# Patient Record
Sex: FEMALE | Race: WHITE | NOT HISPANIC OR LATINO | Employment: OTHER | URBAN - METROPOLITAN AREA
[De-identification: names, ages, dates, MRNs, and addresses within clinical notes are randomized per-mention and may not be internally consistent; named-entity substitution may affect disease eponyms.]

---

## 2024-07-12 ENCOUNTER — APPOINTMENT (OUTPATIENT)
Dept: PRIMARY CARE | Facility: CLINIC | Age: 29
End: 2024-07-12
Payer: COMMERCIAL

## 2024-07-19 ENCOUNTER — APPOINTMENT (OUTPATIENT)
Dept: PRIMARY CARE | Facility: CLINIC | Age: 29
End: 2024-07-19
Payer: COMMERCIAL

## 2024-07-26 ENCOUNTER — LAB (OUTPATIENT)
Dept: LAB | Facility: LAB | Age: 29
End: 2024-07-26
Payer: COMMERCIAL

## 2024-07-26 ENCOUNTER — OFFICE VISIT (OUTPATIENT)
Dept: PRIMARY CARE | Facility: CLINIC | Age: 29
End: 2024-07-26
Payer: COMMERCIAL

## 2024-07-26 VITALS
WEIGHT: 172 LBS | HEART RATE: 74 BPM | DIASTOLIC BLOOD PRESSURE: 60 MMHG | BODY MASS INDEX: 31.65 KG/M2 | OXYGEN SATURATION: 98 % | SYSTOLIC BLOOD PRESSURE: 100 MMHG | HEIGHT: 62 IN | RESPIRATION RATE: 18 BRPM

## 2024-07-26 DIAGNOSIS — R55 NEAR SYNCOPE: ICD-10-CM

## 2024-07-26 DIAGNOSIS — R00.2 PALPITATIONS: ICD-10-CM

## 2024-07-26 DIAGNOSIS — R42 DIZZINESS: ICD-10-CM

## 2024-07-26 DIAGNOSIS — Z78.0 POSTMENOPAUSAL: ICD-10-CM

## 2024-07-26 DIAGNOSIS — R55 NEAR SYNCOPE: Primary | ICD-10-CM

## 2024-07-26 DIAGNOSIS — D50.8 IRON DEFICIENCY ANEMIA SECONDARY TO INADEQUATE DIETARY IRON INTAKE: ICD-10-CM

## 2024-07-26 PROBLEM — Z90.710 H/O TOTAL VAGINAL HYSTERECTOMY: Status: ACTIVE | Noted: 2023-03-13

## 2024-07-26 PROBLEM — E55.9 VITAMIN D DEFICIENCY: Status: ACTIVE | Noted: 2021-05-26

## 2024-07-26 PROBLEM — E03.9 ACQUIRED HYPOTHYROIDISM: Status: ACTIVE | Noted: 2021-05-26

## 2024-07-26 PROCEDURE — 36415 COLL VENOUS BLD VENIPUNCTURE: CPT

## 2024-07-26 PROCEDURE — 3008F BODY MASS INDEX DOCD: CPT | Performed by: NURSE PRACTITIONER

## 2024-07-26 PROCEDURE — 84443 ASSAY THYROID STIM HORMONE: CPT

## 2024-07-26 PROCEDURE — 85027 COMPLETE CBC AUTOMATED: CPT

## 2024-07-26 PROCEDURE — 82670 ASSAY OF TOTAL ESTRADIOL: CPT

## 2024-07-26 PROCEDURE — 83036 HEMOGLOBIN GLYCOSYLATED A1C: CPT

## 2024-07-26 PROCEDURE — 99203 OFFICE O/P NEW LOW 30 MIN: CPT | Performed by: NURSE PRACTITIONER

## 2024-07-26 PROCEDURE — 84439 ASSAY OF FREE THYROXINE: CPT

## 2024-07-26 PROCEDURE — 84481 FREE ASSAY (FT-3): CPT

## 2024-07-26 PROCEDURE — 84144 ASSAY OF PROGESTERONE: CPT

## 2024-07-26 PROCEDURE — 82306 VITAMIN D 25 HYDROXY: CPT

## 2024-07-26 PROCEDURE — 82607 VITAMIN B-12: CPT

## 2024-07-26 PROCEDURE — 80053 COMPREHEN METABOLIC PANEL: CPT

## 2024-07-26 PROCEDURE — 80061 LIPID PANEL: CPT

## 2024-07-26 RX ORDER — FERROUS SULFATE 325(65) MG
325 TABLET, DELAYED RELEASE (ENTERIC COATED) ORAL EVERY OTHER DAY
Qty: 15 TABLET | Refills: 2 | Status: SHIPPED | OUTPATIENT
Start: 2024-07-26 | End: 2024-10-24

## 2024-07-26 RX ORDER — LEVOTHYROXINE SODIUM 75 UG/1
75 TABLET ORAL DAILY
COMMUNITY

## 2024-07-26 RX ORDER — TRIAMCINOLONE ACETONIDE 1 MG/G
OINTMENT TOPICAL 2 TIMES DAILY
COMMUNITY
Start: 2022-08-31

## 2024-07-26 ASSESSMENT — ENCOUNTER SYMPTOMS
NEUROLOGICAL NEGATIVE: 1
ALLERGIC/IMMUNOLOGIC NEGATIVE: 1
RESPIRATORY NEGATIVE: 1
CONSTITUTIONAL NEGATIVE: 1
EYES NEGATIVE: 1
HEMATOLOGIC/LYMPHATIC NEGATIVE: 1
PSYCHIATRIC NEGATIVE: 1
ROS SKIN COMMENTS: PALE
MUSCULOSKELETAL NEGATIVE: 1
CARDIOVASCULAR NEGATIVE: 1
ENDOCRINE NEGATIVE: 1
GASTROINTESTINAL NEGATIVE: 1

## 2024-07-26 ASSESSMENT — PAIN SCALES - GENERAL: PAINLEVEL: 0-NO PAIN

## 2024-07-26 NOTE — PROGRESS NOTES
"Subjective   Patient ID: Radha Evans is a 29 y.o. female who presents for Establish Care.    Patient is feeling really dizzy and like her pressure is really low. She went to [facility] a few years ago, she had hysterectomy.   She reports she get very short of breath, will get random chest pains when she feels dizzy but not all of the time.   She doesn't have bp cuff at home.   Patient never passes out but she does feel like she could a lot of the time.   She does not drink a lot of water but drinks a lot of sparkling water.       Review of Systems   Constitutional: Negative.    HENT: Negative.     Eyes: Negative.    Respiratory: Negative.     Cardiovascular: Negative.    Gastrointestinal: Negative.    Endocrine: Negative.    Genitourinary: Negative.    Musculoskeletal: Negative.    Skin: Negative.         Pale     Allergic/Immunologic: Negative.    Neurological: Negative.    Hematological: Negative.    Psychiatric/Behavioral: Negative.         Objective   /60   Pulse 74   Resp 18   Ht 1.575 m (5' 2\")   Wt 78 kg (172 lb)   SpO2 98%   BMI 31.46 kg/m²     Physical Exam  Vitals reviewed.   HENT:      Head: Normocephalic.   Neck:      Thyroid: No thyroid mass, thyromegaly or thyroid tenderness.   Cardiovascular:      Rate and Rhythm: Normal rate and regular rhythm.      Pulses: Normal pulses.      Heart sounds: Normal heart sounds. No murmur heard.     No friction rub. No gallop.   Pulmonary:      Effort: Pulmonary effort is normal. No respiratory distress.      Breath sounds: Normal breath sounds. No stridor. No wheezing, rhonchi or rales.   Chest:      Chest wall: No tenderness.   Musculoskeletal:      Cervical back: Full passive range of motion without pain and normal range of motion.   Lymphadenopathy:      Cervical: No cervical adenopathy.   Neurological:      General: No focal deficit present.      Mental Status: She is alert and oriented to person, place, and time. Mental status is at " baseline.         Assessment/Plan   Problem List Items Addressed This Visit             ICD-10-CM    Iron deficiency anemia secondary to inadequate dietary iron intake D50.8    Relevant Medications    ferrous sulfate 325 (65 Fe) MG EC tablet     Other Visit Diagnoses         Codes    Near syncope    -  Primary R55    Relevant Orders    Lipid Panel    Comprehensive Metabolic Panel    CBC    Hemoglobin A1C    Vitamin D 25-Hydroxy,Total (for eval of Vitamin D levels)    Vitamin B12    Holter or Event Cardiac Monitor    Transthoracic Echo Complete    T4, free    T3, free    TSH    Dizziness     R42    Relevant Orders    Lipid Panel    Comprehensive Metabolic Panel    CBC    Hemoglobin A1C    Vitamin D 25-Hydroxy,Total (for eval of Vitamin D levels)    Vitamin B12    Holter or Event Cardiac Monitor    Transthoracic Echo Complete    T4, free    T3, free    TSH    Palpitations     R00.2    Relevant Orders    Lipid Panel    Comprehensive Metabolic Panel    CBC    Hemoglobin A1C    Vitamin D 25-Hydroxy,Total (for eval of Vitamin D levels)    Vitamin B12    Holter or Event Cardiac Monitor    Transthoracic Echo Complete    T4, free    T3, free    TSH    Postmenopausal     Z78.0    Relevant Orders    Estradiol    Progesterone

## 2024-07-27 LAB
25(OH)D3 SERPL-MCNC: 15 NG/ML (ref 30–100)
ALBUMIN SERPL BCP-MCNC: 4.7 G/DL (ref 3.4–5)
ALP SERPL-CCNC: 58 U/L (ref 33–110)
ALT SERPL W P-5'-P-CCNC: 13 U/L (ref 7–45)
ANION GAP SERPL CALC-SCNC: 12 MMOL/L (ref 10–20)
AST SERPL W P-5'-P-CCNC: 12 U/L (ref 9–39)
BILIRUB SERPL-MCNC: 0.6 MG/DL (ref 0–1.2)
BUN SERPL-MCNC: 9 MG/DL (ref 6–23)
CALCIUM SERPL-MCNC: 9.7 MG/DL (ref 8.6–10.6)
CHLORIDE SERPL-SCNC: 105 MMOL/L (ref 98–107)
CHOLEST SERPL-MCNC: 190 MG/DL (ref 0–199)
CHOLESTEROL/HDL RATIO: 3.3
CO2 SERPL-SCNC: 27 MMOL/L (ref 21–32)
CREAT SERPL-MCNC: 0.62 MG/DL (ref 0.5–1.05)
EGFRCR SERPLBLD CKD-EPI 2021: >90 ML/MIN/1.73M*2
ERYTHROCYTE [DISTWIDTH] IN BLOOD BY AUTOMATED COUNT: 13 % (ref 11.5–14.5)
EST. AVERAGE GLUCOSE BLD GHB EST-MCNC: 100 MG/DL
ESTRADIOL SERPL-MCNC: 64 PG/ML
GLUCOSE SERPL-MCNC: 91 MG/DL (ref 74–99)
HBA1C MFR BLD: 5.1 %
HCT VFR BLD AUTO: 41.2 % (ref 36–46)
HDLC SERPL-MCNC: 57.2 MG/DL
HGB BLD-MCNC: 12.8 G/DL (ref 12–16)
LDLC SERPL CALC-MCNC: 123 MG/DL
MCH RBC QN AUTO: 25.3 PG (ref 26–34)
MCHC RBC AUTO-ENTMCNC: 31.1 G/DL (ref 32–36)
MCV RBC AUTO: 81 FL (ref 80–100)
NON HDL CHOLESTEROL: 133 MG/DL (ref 0–149)
NRBC BLD-RTO: 0 /100 WBCS (ref 0–0)
PLATELET # BLD AUTO: 216 X10*3/UL (ref 150–450)
POTASSIUM SERPL-SCNC: 4.1 MMOL/L (ref 3.5–5.3)
PROGEST SERPL-MCNC: <0.3 NG/ML
PROT SERPL-MCNC: 7.2 G/DL (ref 6.4–8.2)
RBC # BLD AUTO: 5.06 X10*6/UL (ref 4–5.2)
SODIUM SERPL-SCNC: 140 MMOL/L (ref 136–145)
T3FREE SERPL-MCNC: 3.1 PG/ML (ref 2.3–4.2)
T4 FREE SERPL-MCNC: 1.42 NG/DL (ref 0.78–1.48)
TRIGL SERPL-MCNC: 48 MG/DL (ref 0–149)
TSH SERPL-ACNC: 1.42 MIU/L (ref 0.44–3.98)
VIT B12 SERPL-MCNC: 892 PG/ML (ref 211–911)
VLDL: 10 MG/DL (ref 0–40)
WBC # BLD AUTO: 5.9 X10*3/UL (ref 4.4–11.3)

## 2024-07-29 ENCOUNTER — TELEPHONE (OUTPATIENT)
Dept: PRIMARY CARE | Facility: CLINIC | Age: 29
End: 2024-07-29

## 2024-07-30 ENCOUNTER — TELEPHONE (OUTPATIENT)
Dept: PRIMARY CARE | Facility: CLINIC | Age: 29
End: 2024-07-30
Payer: COMMERCIAL

## 2024-07-30 DIAGNOSIS — E55.9 VITAMIN D DEFICIENCY: Primary | ICD-10-CM

## 2024-07-30 RX ORDER — ERGOCALCIFEROL 1.25 MG/1
50000 CAPSULE ORAL
Qty: 12 CAPSULE | Refills: 0 | Status: SHIPPED | OUTPATIENT
Start: 2024-08-04 | End: 2024-10-27

## 2024-07-30 NOTE — TELEPHONE ENCOUNTER
Patient received a message from Juana regarding recent test results.  She would like a phone call to discuss the next steps of the results for the hormones.     What does she need to do about it?

## 2024-08-05 ENCOUNTER — TELEPHONE (OUTPATIENT)
Dept: PRIMARY CARE | Facility: CLINIC | Age: 29
End: 2024-08-05
Payer: COMMERCIAL

## 2024-08-05 NOTE — TELEPHONE ENCOUNTER
Patient called stating she would like to go over her labs with Juana and she would like to discuss going on  3rd Generation birth control pills.     Patient can be reached at [phone]  Please advise     Thank you !

## 2024-08-09 ENCOUNTER — HOSPITAL ENCOUNTER (OUTPATIENT)
Dept: CARDIOLOGY | Facility: CLINIC | Age: 29
Discharge: HOME | End: 2024-08-09
Payer: COMMERCIAL

## 2024-08-09 ENCOUNTER — OFFICE VISIT (OUTPATIENT)
Dept: OBSTETRICS AND GYNECOLOGY | Facility: CLINIC | Age: 29
End: 2024-08-09
Payer: COMMERCIAL

## 2024-08-09 ENCOUNTER — APPOINTMENT (OUTPATIENT)
Dept: CARDIOLOGY | Facility: CLINIC | Age: 29
End: 2024-08-09
Payer: COMMERCIAL

## 2024-08-09 VITALS
SYSTOLIC BLOOD PRESSURE: 94 MMHG | WEIGHT: 178.6 LBS | DIASTOLIC BLOOD PRESSURE: 69 MMHG | HEIGHT: 62 IN | BODY MASS INDEX: 32.87 KG/M2

## 2024-08-09 DIAGNOSIS — R00.2 PALPITATIONS: ICD-10-CM

## 2024-08-09 DIAGNOSIS — E34.9 HORMONE IMBALANCE: Primary | ICD-10-CM

## 2024-08-09 DIAGNOSIS — R55 NEAR SYNCOPE: ICD-10-CM

## 2024-08-09 DIAGNOSIS — R42 DIZZINESS: ICD-10-CM

## 2024-08-09 LAB
AORTIC VALVE PEAK VELOCITY: 1.25 M/S
AV PEAK GRADIENT: 6.2 MMHG
AVA (PEAK VEL): 2.02 CM2
EJECTION FRACTION APICAL 4 CHAMBER: 69.8
EJECTION FRACTION: 65 %
LEFT ATRIUM VOLUME AREA LENGTH INDEX BSA: 18.2 ML/M2
LEFT VENTRICLE INTERNAL DIMENSION DIASTOLE: 4.29 CM (ref 3.5–6)
LEFT VENTRICULAR OUTFLOW TRACT DIAMETER: 2 CM
MITRAL VALVE E/A RATIO: 1.24
RIGHT VENTRICLE FREE WALL PEAK S': 11 CM/S
RIGHT VENTRICLE PEAK SYSTOLIC PRESSURE: 16.4 MMHG

## 2024-08-09 PROCEDURE — 93306 TTE W/DOPPLER COMPLETE: CPT | Performed by: INTERNAL MEDICINE

## 2024-08-09 PROCEDURE — 1036F TOBACCO NON-USER: CPT

## 2024-08-09 PROCEDURE — 99203 OFFICE O/P NEW LOW 30 MIN: CPT

## 2024-08-09 PROCEDURE — 3008F BODY MASS INDEX DOCD: CPT

## 2024-08-09 PROCEDURE — 93246 EXT ECG>7D<15D RECORDING: CPT

## 2024-08-09 PROCEDURE — 93306 TTE W/DOPPLER COMPLETE: CPT

## 2024-08-09 ASSESSMENT — PATIENT HEALTH QUESTIONNAIRE - PHQ9
2. FEELING DOWN, DEPRESSED OR HOPELESS: NOT AT ALL
1. LITTLE INTEREST OR PLEASURE IN DOING THINGS: NOT AT ALL
SUM OF ALL RESPONSES TO PHQ9 QUESTIONS 1 & 2: 0

## 2024-08-09 NOTE — PROGRESS NOTES
Subjective   Patient ID: Radha Evans is a 29 y.o. female who presents for Premenopausal symptoms.  HPI  Patient here for new patient visit with concerns of hormonal issues,  possible premenopausal symptoms with mood changes and weight gain. Patient reports that mood swings are all throughout the month and not specific to certain times of month.     Patient reports being seen by primary care and having hormone levels drawn (estrogen and progesterone) showing low hormones.    Patient does endorse having a partial hysterectomy in 2023 for adenomyosis.       Review of Systems    Objective   Physical Exam  Constitutional:       Appearance: Normal appearance.   Eyes:      Conjunctiva/sclera: Conjunctivae normal.   Pulmonary:      Effort: Pulmonary effort is normal.   Neurological:      Mental Status: She is alert.   Psychiatric:         Mood and Affect: Mood normal.         Assessment/Plan   Reviewed hormone levels with patient and significant other. Estradiol 64 and progesterone <0.3. discussed with patient that levels are consistent with the follicular phase of the menstrual cycle and do not indicate menopausal state. Discussed with patient and  removal of uterus does not stop female hormones as her ovaries release hormones. Consulted with Dr. Alvarenga given patient's symptoms. Per Dr. Alvarenga labs are wnl, though can order an FSH to completely rule out menopause as it was not completed initially by PCP. FSH order placed.     Mood wings: discussed option for referral to psych or med management which patient declines at this time.     Weight gain: review of EMR shows 20# weight gain in 2 years.  TSH completed 2 weeks ago wnl. Discussed with patient increasing physical activity to 150 minutes a week with 2 days of strength training.  Encouraged plant forward eating less processed foods.           RUPINDER Banegas 08/09/24 2:06 PM

## 2024-08-13 ENCOUNTER — HOSPITAL ENCOUNTER (OUTPATIENT)
Dept: RADIOLOGY | Facility: HOSPITAL | Age: 29
Discharge: HOME | End: 2024-08-13
Payer: COMMERCIAL

## 2024-08-13 ENCOUNTER — APPOINTMENT (OUTPATIENT)
Dept: OBSTETRICS AND GYNECOLOGY | Facility: CLINIC | Age: 29
End: 2024-08-13
Payer: COMMERCIAL

## 2024-08-13 ENCOUNTER — TELEPHONE (OUTPATIENT)
Dept: PRIMARY CARE | Facility: CLINIC | Age: 29
End: 2024-08-13

## 2024-08-13 VITALS
SYSTOLIC BLOOD PRESSURE: 100 MMHG | HEIGHT: 63 IN | BODY MASS INDEX: 30.83 KG/M2 | WEIGHT: 174 LBS | DIASTOLIC BLOOD PRESSURE: 71 MMHG

## 2024-08-13 DIAGNOSIS — N95.1 MENOPAUSAL SYMPTOM: Primary | ICD-10-CM

## 2024-08-13 DIAGNOSIS — E55.9 VITAMIN D DEFICIENCY: Primary | ICD-10-CM

## 2024-08-13 DIAGNOSIS — R10.2 PELVIC PAIN: ICD-10-CM

## 2024-08-13 DIAGNOSIS — R68.82 DECREASED SEX DRIVE: ICD-10-CM

## 2024-08-13 DIAGNOSIS — R63.5 WEIGHT GAIN: ICD-10-CM

## 2024-08-13 PROCEDURE — 1036F TOBACCO NON-USER: CPT | Performed by: OBSTETRICS & GYNECOLOGY

## 2024-08-13 PROCEDURE — 3008F BODY MASS INDEX DOCD: CPT | Performed by: OBSTETRICS & GYNECOLOGY

## 2024-08-13 PROCEDURE — 76856 US EXAM PELVIC COMPLETE: CPT | Performed by: RADIOLOGY

## 2024-08-13 PROCEDURE — 99204 OFFICE O/P NEW MOD 45 MIN: CPT | Performed by: OBSTETRICS & GYNECOLOGY

## 2024-08-13 PROCEDURE — 76830 TRANSVAGINAL US NON-OB: CPT | Performed by: RADIOLOGY

## 2024-08-13 PROCEDURE — 76856 US EXAM PELVIC COMPLETE: CPT

## 2024-08-13 NOTE — PROGRESS NOTES
"Subjective   Patient ID: Radha Evans is a 29 y.o. female who presents for Weight Gain (Over short period of time /Hyst done 2yrs ago has both ovaries, Per pt there is a h/o ovarian /Hotflashes, sex drive and mood swings).    Patient presents as a new patient in regards to her menopause symptoms    Status post hysterectomy  Blood work reviewed from July which shows a normal estradiol level  FSH ordered.    Discussed with patient her symptoms are during the day and not at night  Is having trouble sleeping due to insomnia and her mental process but not due to night sweats  Has noticed weight gain  Thyroid testing is normal  Will follow-up with endocrinology or primary care provider for medication      ROS  All Normal Review of Systems  Constitutional: no fever, no chills, no recent weight gain, no recent weight loss and no fatigue.    Gastrointestinal: no abdominal pain, no constipation, no nausea, no diarrhea and no vomiting.    Genitourinary: no dysuria, no urinary incontinence, no vaginal dryness, no vaginal itching, no dyspareunia, no pelvic pain, no dysmenorrhea, no sexual problems, no change in urinary frequency, no vaginal discharge, no unexplained vaginal bleeding and no lesion/sore.    Breasts: No masses.  No nipple discharge.  No redness    Objective   /71   Ht 1.6 m (5' 3\")   Wt 78.9 kg (174 lb)   BMI 30.82 kg/m²    Physical Exam  General: No distress.  Alert and oriented  Abdomen: Soft, nontender, nondistended  External Genitalia:  no masses.  no erythema.  no discharge noted.  Vagina:  no masses.  Cuff intact    Cervix: absent    Uterus:  absent  Adnexa: R: no masses, non tender.    Adnexa: L: no masses.  non tender  Extremities: No swelling  Psych: No sadness.      Assessment/Plan   1) menopause symptoms  Estradiol is normal  Thyroid testing is normal  No need for any supplements at this point  Symptoms are mostly during the day.  Is not having night sweats at all    2) Weight gain  Please " see your primary care provider for possible medical treatment    3) Pelvic pain on exam  Ultrasound ordered    Thank you for your visit to our office today.

## 2024-08-19 ENCOUNTER — APPOINTMENT (OUTPATIENT)
Dept: CARDIOLOGY | Facility: HOSPITAL | Age: 29
End: 2024-08-19
Payer: COMMERCIAL

## 2024-08-19 ENCOUNTER — APPOINTMENT (OUTPATIENT)
Dept: CARDIOLOGY | Facility: CLINIC | Age: 29
End: 2024-08-19
Payer: COMMERCIAL

## 2024-08-23 DIAGNOSIS — E03.9 ACQUIRED HYPOTHYROIDISM: Primary | ICD-10-CM

## 2024-08-23 RX ORDER — LEVOTHYROXINE SODIUM 75 UG/1
75 TABLET ORAL DAILY
Qty: 90 TABLET | Refills: 1 | Status: SHIPPED | OUTPATIENT
Start: 2024-08-23 | End: 2025-02-19

## 2024-08-30 ENCOUNTER — APPOINTMENT (OUTPATIENT)
Dept: PRIMARY CARE | Facility: CLINIC | Age: 29
End: 2024-08-30
Payer: COMMERCIAL

## 2024-08-30 VITALS
SYSTOLIC BLOOD PRESSURE: 107 MMHG | WEIGHT: 174 LBS | DIASTOLIC BLOOD PRESSURE: 75 MMHG | BODY MASS INDEX: 30.83 KG/M2 | HEIGHT: 63 IN | RESPIRATION RATE: 18 BRPM | HEART RATE: 83 BPM

## 2024-08-30 DIAGNOSIS — D50.8 IRON DEFICIENCY ANEMIA SECONDARY TO INADEQUATE DIETARY IRON INTAKE: ICD-10-CM

## 2024-08-30 DIAGNOSIS — Z90.710 H/O: HYSTERECTOMY: ICD-10-CM

## 2024-08-30 DIAGNOSIS — I95.9 HYPOTENSION, UNSPECIFIED HYPOTENSION TYPE: Primary | ICD-10-CM

## 2024-08-30 PROCEDURE — 99213 OFFICE O/P EST LOW 20 MIN: CPT | Performed by: NURSE PRACTITIONER

## 2024-08-30 PROCEDURE — 3008F BODY MASS INDEX DOCD: CPT | Performed by: NURSE PRACTITIONER

## 2024-08-30 ASSESSMENT — ENCOUNTER SYMPTOMS
FATIGUE: 1
MYALGIAS: 1
ARTHRALGIAS: 1

## 2024-08-30 NOTE — PATIENT INSTRUCTIONS
Foods containing high levels of these specific phytoestrogens include:    Fruits, including apples, berries, grapes, peaches, pears, plums  Grains, such as barley, oats, wheat germ  Liquids derived from plants, specifically beer, coffee, olive oil, red wine, tea  Nuts and Seeds, including almonds, flaxseeds, peanuts, sesame seeds, sunflower seeds  Soy and soy products, such as soybeans, tofu, miso soup, miso paste   Vegetables, particularly broccoli, [place] sprouts, kale, onions, spinach, sprouts   Research

## 2024-08-30 NOTE — ASSESSMENT & PLAN NOTE
At current progesterone was low on random check estrogen was normal. We discussed that ovaries are compensating to continue making estrogen. At some point when she approaches menopause she should make sure estrogen stores remain to prevent decline in bone density.   Make sure to get 1200 mg of calcium a day and keep vitamin D levels between 30-50.   Weight bearing exercises when possible.

## 2024-08-30 NOTE — ASSESSMENT & PLAN NOTE
TTE normal.  No significant ectopic burden on holter monitor. Orthostatic vital signs without any significant changes.   Send to electrophysiology for autonomic testing given duration of symptoms and activity intolerance at current.

## 2024-08-30 NOTE — PROGRESS NOTES
"Subjective   Patient ID: Radha Evans is a 29 y.o. female who presents for Follow-up.    Patient had adenomyosis was on birth control for a year to stop the periods because they lasted for 2 weeks. Had to switch 3-4 times but then they opted to do hysterectomy .   Hot flashes come and go during the day and joint stiffness and pain.     Hypotension- dizzy, heart problems gets shaky, almost syncope as well.       Review of Systems   Constitutional:  Positive for fatigue.   Endocrine: Heat intolerance: hot flashes.   Musculoskeletal:  Positive for arthralgias and myalgias.     Objective   /75 (Patient Position: Standing)   Pulse 83   Resp 18   Ht 1.6 m (5' 3\")   Wt 78.9 kg (174 lb)   BMI 30.82 kg/m²     Physical Exam  Vitals reviewed.   HENT:      Head: Normocephalic.   Cardiovascular:      Rate and Rhythm: Normal rate and regular rhythm.      Pulses: Normal pulses.      Heart sounds: Normal heart sounds.   Pulmonary:      Effort: Pulmonary effort is normal.      Breath sounds: Normal breath sounds.   Neurological:      General: No focal deficit present.      Mental Status: She is alert and oriented to person, place, and time. Mental status is at baseline.       Assessment/Plan   Problem List Items Addressed This Visit             ICD-10-CM    H/O: hysterectomy Z90.710     At current progesterone was low on random check estrogen was normal. We discussed that ovaries are compensating to continue making estrogen. At some point when she approaches menopause she should make sure estrogen stores remain to prevent decline in bone density.   Make sure to get 1200 mg of calcium a day and keep vitamin D levels between 30-50.   Weight bearing exercises when possible.          Iron deficiency anemia secondary to inadequate dietary iron intake D50.8     Increase iron through dietary fiber intake currently supplemented on prescription iron.          Hypotension - Primary I95.9     TTE normal.  No significant ectopic " burden on holter monitor. Orthostatic vital signs without any significant changes.   Send to electrophysiology for autonomic testing given duration of symptoms and activity intolerance at current.          Relevant Orders    Referral to Cardiac Electrophysiology

## 2024-09-05 ENCOUNTER — APPOINTMENT (OUTPATIENT)
Dept: CARDIOLOGY | Facility: CLINIC | Age: 29
End: 2024-09-05
Payer: COMMERCIAL

## 2024-09-19 PROBLEM — D62 ANEMIA DUE TO BLOOD LOSS, ACUTE: Status: ACTIVE | Noted: 2018-05-23

## 2024-09-19 PROBLEM — E05.90 HYPERTHYROIDISM: Status: ACTIVE | Noted: 2022-08-04

## 2024-09-19 PROBLEM — J10.1 INFLUENZA B: Status: ACTIVE | Noted: 2024-09-19

## 2024-09-19 PROBLEM — N60.11 FIBROCYSTIC BREAST CHANGES, BILATERAL: Status: ACTIVE | Noted: 2017-01-04

## 2024-09-19 PROBLEM — N60.12 FIBROCYSTIC BREAST CHANGES, BILATERAL: Status: ACTIVE | Noted: 2017-01-04

## 2024-09-30 ENCOUNTER — APPOINTMENT (OUTPATIENT)
Dept: CARDIOLOGY | Facility: CLINIC | Age: 29
End: 2024-09-30
Payer: COMMERCIAL

## 2025-03-04 ENCOUNTER — APPOINTMENT (OUTPATIENT)
Dept: PRIMARY CARE | Facility: CLINIC | Age: 30
End: 2025-03-04
Payer: COMMERCIAL

## 2025-03-04 DIAGNOSIS — E03.9 ACQUIRED HYPOTHYROIDISM: ICD-10-CM

## 2025-03-04 RX ORDER — LEVOTHYROXINE SODIUM 75 UG/1
TABLET ORAL
Qty: 90 TABLET | Refills: 0 | Status: SHIPPED | OUTPATIENT
Start: 2025-03-04

## 2025-03-06 ENCOUNTER — TELEPHONE (OUTPATIENT)
Dept: PRIMARY CARE | Facility: CLINIC | Age: 30
End: 2025-03-06
Payer: COMMERCIAL

## 2025-03-07 ENCOUNTER — APPOINTMENT (OUTPATIENT)
Dept: PRIMARY CARE | Facility: CLINIC | Age: 30
End: 2025-03-07
Payer: COMMERCIAL

## 2025-06-04 DIAGNOSIS — E03.9 ACQUIRED HYPOTHYROIDISM: ICD-10-CM

## 2025-06-05 PROBLEM — E05.90 HYPERTHYROIDISM: Status: RESOLVED | Noted: 2022-08-04 | Resolved: 2025-06-05

## 2025-06-05 PROBLEM — J10.1 INFLUENZA B: Status: RESOLVED | Noted: 2024-09-19 | Resolved: 2025-06-05

## 2025-06-05 PROBLEM — D62 ANEMIA DUE TO BLOOD LOSS, ACUTE: Status: RESOLVED | Noted: 2018-05-23 | Resolved: 2025-06-05

## 2025-06-05 RX ORDER — LEVOTHYROXINE SODIUM 75 UG/1
TABLET ORAL
Qty: 90 TABLET | Refills: 0 | Status: SHIPPED | OUTPATIENT
Start: 2025-06-05

## 2025-06-06 ENCOUNTER — APPOINTMENT (OUTPATIENT)
Dept: PRIMARY CARE | Facility: CLINIC | Age: 30
End: 2025-06-06
Payer: COMMERCIAL

## 2025-06-06 NOTE — PROGRESS NOTES
"Subjective   Patient ID: Radha Evans is a 29 y.o. female who presents for No chief complaint on file..    HPI entered by Medical Assistant and reviewed/updated by myself.    Patient presents in the office today to establish care. Patient was seeing ***. Patient does not go to *** anymore due to ***. Patient heard about the practice through ***.     Patient presents in office for ***. Ongoing x ***. Symptoms included ****. Has tried ***. Admits/Denies relief.       The patient's allergies, medications, and history were reviewed with them today and updated as indicated.    Review of Systems   Objective   Vital Signs: There were no vitals taken for this visit.    Physical ExamPOCT today: No results found for this visit on 06/06/25.     Assessment & Plan  There are no diagnoses linked to this encounter.Reviewed/discussed treatment options and health maintenance. Take medications as prescribed. We will contact you with the results of any ordered testing; please send a Fondu message or call the office if you do not hear from us. Follow up in the office {Follow up:14792::\"and as needed.\"} Return sooner if symptoms do not resolve as expected.     Kacy Aguirre, APRN-CNP, DNP  Family Nurse Practitioner  [facility]  "

## 2025-06-09 ENCOUNTER — APPOINTMENT (OUTPATIENT)
Dept: PRIMARY CARE | Facility: CLINIC | Age: 30
End: 2025-06-09
Payer: COMMERCIAL

## 2025-06-09 VITALS
BODY MASS INDEX: 24.63 KG/M2 | DIASTOLIC BLOOD PRESSURE: 82 MMHG | HEIGHT: 63 IN | HEART RATE: 94 BPM | SYSTOLIC BLOOD PRESSURE: 112 MMHG | WEIGHT: 139 LBS

## 2025-06-09 DIAGNOSIS — R42 DIZZINESS: Primary | ICD-10-CM

## 2025-06-09 DIAGNOSIS — R00.2 PALPITATIONS: ICD-10-CM

## 2025-06-09 PROCEDURE — 1036F TOBACCO NON-USER: CPT | Performed by: FAMILY MEDICINE

## 2025-06-09 PROCEDURE — 99214 OFFICE O/P EST MOD 30 MIN: CPT | Performed by: FAMILY MEDICINE

## 2025-06-09 PROCEDURE — 3008F BODY MASS INDEX DOCD: CPT | Performed by: FAMILY MEDICINE

## 2025-06-09 ASSESSMENT — PATIENT HEALTH QUESTIONNAIRE - PHQ9
SUM OF ALL RESPONSES TO PHQ9 QUESTIONS 1 AND 2: 0
2. FEELING DOWN, DEPRESSED OR HOPELESS: NOT AT ALL
1. LITTLE INTEREST OR PLEASURE IN DOING THINGS: NOT AT ALL

## 2025-06-09 ASSESSMENT — ENCOUNTER SYMPTOMS
DIZZINESS: 0
SHORTNESS OF BREATH: 0
FEVER: 0
ACTIVITY CHANGE: 0
HEADACHES: 0
FATIGUE: 0

## 2025-06-09 NOTE — PROGRESS NOTES
"Subjective   Patient ID: Radha Evans is a 29 y.o. female who presents for New Patient Visit (Having dizziness, sob, eyes blurry. Did a holter a year ago).    Dizziness/lightheadedness  - reports she has had an extensive history of low blood pressure, low iron and low vitamin d   - 1 year ago she had a heart monitor placed on her \"holter\" which was negative   - was recommended to f/u with a cardiologist for tilt-table testing but did not follow up   - she is now having regular dizziness, lightheadedness, weakness, fatigue   - reports when she stands up for long periods she often will feel dizzy and need to sit down   - all her symptoms have been getting gradually worse with time   - does typically stay well hydrated and eats regular meals throughout the course of the day   - has been on semaglutide since January of this year and has lost 35 pounds   - does have known hypothyroidism and has been on the same dose of synthroid over many years   - did have her last labs checked in August of this year   - has noticed worsening hair loss, as well as difficulty with her weight          Review of Systems   Constitutional:  Negative for activity change, fatigue and fever.   Respiratory:  Negative for shortness of breath.    Cardiovascular:  Negative for chest pain.   Neurological:  Negative for dizziness and headaches.       Objective   /82 (BP Location: Left arm, Patient Position: Sitting)   Pulse 94   Ht 1.588 m (5' 2.5\")   Wt 63 kg (139 lb)   BMI 25.02 kg/m²     Physical Exam  Constitutional:       Appearance: Normal appearance.   Cardiovascular:      Rate and Rhythm: Normal rate and regular rhythm.   Pulmonary:      Effort: Pulmonary effort is normal.      Breath sounds: Normal breath sounds.   Neurological:      Mental Status: She is alert.   Psychiatric:         Mood and Affect: Mood normal.         Behavior: Behavior normal.         Assessment/Plan   Problem List Items Addressed This Visit  "   None  Visit Diagnoses         Codes      Dizziness    -  Primary R42    stable  - positive orthostatic hypertension in office today   - concern for POTS   - referral placed for autonomic tesitng     Relevant Orders    CBC and Auto Differential    Comprehensive metabolic panel    Vitamin D 25-Hydroxy,Total (for eval of Vitamin D levels)    Vitamin B12    Tsh With Reflex To Free T4 If Abnormal    Hemoglobin A1c    Referral to Cardiology      Palpitations     R00.2    stable  - concern for POTS   - check for autonomic testing with cardiology   - f/u with specialist     Relevant Orders    CBC and Auto Differential    Comprehensive metabolic panel    Vitamin D 25-Hydroxy,Total (for eval of Vitamin D levels)    Vitamin B12    Tsh With Reflex To Free T4 If Abnormal    Hemoglobin A1c    Referral to Cardiology

## 2025-06-10 LAB
25(OH)D3+25(OH)D2 SERPL-MCNC: 31 NG/ML (ref 30–100)
ALBUMIN SERPL-MCNC: 4.5 G/DL (ref 3.6–5.1)
ALP SERPL-CCNC: 52 U/L (ref 31–125)
ALT SERPL-CCNC: 9 U/L (ref 6–29)
ANION GAP SERPL CALCULATED.4IONS-SCNC: 11 MMOL/L (CALC) (ref 7–17)
AST SERPL-CCNC: 12 U/L (ref 10–30)
BASOPHILS # BLD AUTO: 28 CELLS/UL (ref 0–200)
BASOPHILS NFR BLD AUTO: 0.5 %
BILIRUB SERPL-MCNC: 0.5 MG/DL (ref 0.2–1.2)
BUN SERPL-MCNC: 6 MG/DL (ref 7–25)
CALCIUM SERPL-MCNC: 9.5 MG/DL (ref 8.6–10.2)
CHLORIDE SERPL-SCNC: 107 MMOL/L (ref 98–110)
CO2 SERPL-SCNC: 21 MMOL/L (ref 20–32)
CREAT SERPL-MCNC: 0.57 MG/DL (ref 0.5–0.96)
EGFRCR SERPLBLD CKD-EPI 2021: 127 ML/MIN/1.73M2
EOSINOPHIL # BLD AUTO: 281 CELLS/UL (ref 15–500)
EOSINOPHIL NFR BLD AUTO: 5.1 %
ERYTHROCYTE [DISTWIDTH] IN BLOOD BY AUTOMATED COUNT: 12.7 % (ref 11–15)
EST. AVERAGE GLUCOSE BLD GHB EST-MCNC: 100 MG/DL
EST. AVERAGE GLUCOSE BLD GHB EST-SCNC: 5.5 MMOL/L
GLUCOSE SERPL-MCNC: 99 MG/DL (ref 65–99)
HBA1C MFR BLD: 5.1 %
HCT VFR BLD AUTO: 38.7 % (ref 35–45)
HGB BLD-MCNC: 12.1 G/DL (ref 11.7–15.5)
LYMPHOCYTES # BLD AUTO: 1177 CELLS/UL (ref 850–3900)
LYMPHOCYTES NFR BLD AUTO: 21.4 %
MCH RBC QN AUTO: 26 PG (ref 27–33)
MCHC RBC AUTO-ENTMCNC: 31.3 G/DL (ref 32–36)
MCV RBC AUTO: 83 FL (ref 80–100)
MONOCYTES # BLD AUTO: 451 CELLS/UL (ref 200–950)
MONOCYTES NFR BLD AUTO: 8.2 %
NEUTROPHILS # BLD AUTO: 3564 CELLS/UL (ref 1500–7800)
NEUTROPHILS NFR BLD AUTO: 64.8 %
PLATELET # BLD AUTO: 229 THOUSAND/UL (ref 140–400)
PMV BLD REES-ECKER: 12.1 FL (ref 7.5–12.5)
POTASSIUM SERPL-SCNC: 4.4 MMOL/L (ref 3.5–5.3)
PROT SERPL-MCNC: 7.1 G/DL (ref 6.1–8.1)
RBC # BLD AUTO: 4.66 MILLION/UL (ref 3.8–5.1)
SODIUM SERPL-SCNC: 139 MMOL/L (ref 135–146)
TSH SERPL-ACNC: 0.68 MIU/L
VIT B12 SERPL-MCNC: 492 PG/ML (ref 200–1100)
WBC # BLD AUTO: 5.5 THOUSAND/UL (ref 3.8–10.8)

## 2025-06-27 ENCOUNTER — APPOINTMENT (OUTPATIENT)
Dept: CARDIOLOGY | Facility: CLINIC | Age: 30
End: 2025-06-27
Payer: COMMERCIAL

## 2025-07-02 ENCOUNTER — APPOINTMENT (OUTPATIENT)
Dept: CARDIOLOGY | Facility: CLINIC | Age: 30
End: 2025-07-02
Payer: COMMERCIAL

## 2025-07-02 ENCOUNTER — TELEPHONE (OUTPATIENT)
Dept: PRIMARY CARE | Facility: CLINIC | Age: 30
End: 2025-07-02

## 2025-07-02 VITALS
HEART RATE: 110 BPM | HEIGHT: 63 IN | DIASTOLIC BLOOD PRESSURE: 62 MMHG | WEIGHT: 133 LBS | SYSTOLIC BLOOD PRESSURE: 90 MMHG | BODY MASS INDEX: 23.57 KG/M2

## 2025-07-02 DIAGNOSIS — R42 DIZZINESS AND GIDDINESS: ICD-10-CM

## 2025-07-02 DIAGNOSIS — I95.9 HYPOTENSION, UNSPECIFIED HYPOTENSION TYPE: Primary | ICD-10-CM

## 2025-07-02 DIAGNOSIS — R00.0 TACHYCARDIA, UNSPECIFIED: ICD-10-CM

## 2025-07-02 DIAGNOSIS — R00.2 PALPITATIONS: ICD-10-CM

## 2025-07-02 PROCEDURE — 93000 ELECTROCARDIOGRAM COMPLETE: CPT | Performed by: INTERNAL MEDICINE

## 2025-07-02 NOTE — LETTER
July 3, 2025     Aron Tapia MD  [id number]  [facility] [address]    Patient: Radha Evans   YOB: 1995   Date of Visit: 7/2/2025       Dear Dr. Aron Tapia MD:    Thank you for referring Radha Evans to me for evaluation. Below are my notes for this consultation.  If you have questions, please do not hesitate to call me. I look forward to following your patient along with you.       Sincerely,     Radha Babcock, APRN-CNP      CC: No Recipients  ______________________________________________________________________________________    Chief Complaint:  Chief Complaint   Patient presents with   • New Patient Visit     Palpitations on and off 2.5 years       Radha Evans is a 29 y.o. female that presents to the office today with her  and children for new patient cardiac evaluation.  She was referred by her primary care provider Dr. Aron Tapia MD for palpitations.  She has no documented history of CAD or valvular abnormalities.  She has a past medical history of palpitations, tachycardia, dizziness, hypertension, hypothyroidism, vitamin D deficiency.  She denies tobacco use, vaping, alcohol use, recreational drug use.  Denies caffeine intake.      She reports she has had palpitations, lightheadedness, tachycardia and low blood pressure for some time but feels her symptoms are worsening.  She reports she was referred to electrophysiology last year but did not follow through.  She reports her heart rate can go as high as 150s at rest.  She reports that she becomes lightheaded dizzy with position changes.  Denies any chest pain, chest pressure, chest tightness.  Reports extreme fatigue and shortness of breath on exertion.      Testing Reviewed  EKG obtained in the office today and verified with Dr. Leavitt shows sinus tachycardia   bpm, QT/Qtc  320/433    8/9/2024 event monitor  The predominant rhythm was Sinus. * The Maximum Heart Rate recorded was 155 bpm, 08/ 15 15: 35:  24, the Minimum Heart Rate recorded was 46 bpm, 08/ 22 07: 25: 49, and the Average Heart Rate was 82 bpm. * There were 19 SVE beats with a burden of < 1 % . * There were 40 Patient Triggers.    8/9/2024 echocardiogram  1. The left ventricular systolic function is normal, with a visually estimated ejection fraction of 65%.   2. There is normal right ventricular global systolic function.   3. RVSP within normal limits.      CBC:   Lab Results   Component Value Date    WBC 5.5 06/09/2025    RBC 4.66 06/09/2025    HGB 12.1 06/09/2025    HCT 38.7 06/09/2025     06/09/2025        CMP:    Lab Results   Component Value Date     06/09/2025    K 4.4 06/09/2025     06/09/2025    CO2 21 06/09/2025    BUN 6 (L) 06/09/2025    CREATININE 0.57 06/09/2025    GLUCOSE 99 06/09/2025    CALCIUM 9.5 06/09/2025       Lipid Profile:    Lab Results   Component Value Date    TRIG 48 07/26/2024    HDL 57.2 07/26/2024    LDLCALC 123 (H) 07/26/2024       BMP:  Lab Results   Component Value Date     06/09/2025     07/26/2024    K 4.4 06/09/2025    K 4.1 07/26/2024     06/09/2025     07/26/2024    CO2 21 06/09/2025    CO2 27 07/26/2024    BUN 6 (L) 06/09/2025    BUN 9 07/26/2024    CREATININE 0.57 06/09/2025    CREATININE 0.62 07/26/2024       CBC:  Lab Results   Component Value Date    WBC 5.5 06/09/2025    WBC 5.9 07/26/2024    RBC 4.66 06/09/2025    RBC 5.06 07/26/2024    HGB 12.1 06/09/2025    HGB 12.8 07/26/2024    HCT 38.7 06/09/2025    HCT 41.2 07/26/2024    MCV 83.0 06/09/2025    MCV 81 07/26/2024    MCH 26.0 (L) 06/09/2025    MCH 25.3 (L) 07/26/2024    MCHC 31.3 (L) 06/09/2025    MCHC 31.1 (L) 07/26/2024    RDW 12.7 06/09/2025    RDW 13.0 07/26/2024     06/09/2025     07/26/2024    MPV 12.1 06/09/2025       Hepatic Function Panel:    Lab Results   Component Value Date    ALKPHOS 52 06/09/2025    ALT 9 06/09/2025    AST 12 06/09/2025    PROT 7.1 06/09/2025    BILITOT 0.5 06/09/2025  "      HgBA1c:    Lab Results   Component Value Date    HGBA1C 5.1 06/09/2025       TSH:    Lab Results   Component Value Date    TSH 0.68 06/09/2025       Problem List[1]    Social History[2]    Medical History[3]    Current Medications[4]    Gluten    Family History[5]    Surgical History[6]       Review of systems  Constitutional: No weight loss, fever, chills, weakness.  Positive for fatigue  HEENT: No visual loss, blurred vision, double vision or yellow sclerae  Skin: No rash or itching  Cardiovascular: No chest pain, pressure or discomfort, edema.  Positive for frequent palpitations  Respiratory: Positive for exertional shortness of breath.denies cough or sputum  Gastrointestinal: No nausea, vomiting or diarrhea. No bloody or dark tarry stools.  Neurological: Positive for lightheadedness.  No headache, syncope.   Musculoskeletal: No muscle, back pain, joint pain or stiffness.  Hematologic: No anemia, bleeding or bruising.    BP 90/62 (BP Location: Left arm, Patient Position: Sitting)   Pulse 110   Ht 1.588 m (5' 2.5\")   Wt 60.3 kg (133 lb)   BMI 23.94 kg/m²     Physical Exam  Constitutional: Well developed, awake/alert x 3, no distress.  Head/Neck: No JVD, No bruits  Respiratory/Thorax: patent airways, CTAB, normal breath sounds with good expansion.  Cardiovascular: Regular  rhythm, irregular rate, no murmurs, normal S1 and S2,   Gastrointestinal: Non distended, soft, non-tender, no rebound tenderness or guarding.  Extremities: No cyanosis, edema.   Neurological: Alert and oriented x 3. Moves extremities spontaneous with purpose.  Psychological: Appropriate mood and behavior  Skin: Warm and Dry. No lesions or rashes.     Assessment/Plan  Palpitations/tachycardia; obtain 14-day Zio patch  Shortness of breath; recent echocardiogram as noted above.  Hypertension;  Hydration has been emphasized  Increased sodium intake has been emphasized  Recommend knee-high compression stockings  Referral to " electrophysiology/Dr. Caballero  Follow-up in the office after monitor for results and further recommendations.  Please excuse any errors in grammar or translation related to dictation, voice recognition software was used to prepare this document.             [1]  Patient Active Problem List  Diagnosis   • H/O: hysterectomy   • Acquired hypothyroidism   • Iron deficiency anemia secondary to inadequate dietary iron intake   • Vitamin D deficiency   • Hypotension   • Fibrocystic breast changes, bilateral   [2]  Social History  Tobacco Use   • Smoking status: Never     Passive exposure: Never   • Smokeless tobacco: Never   • Tobacco comments:     Cigarettes casually   Vaping Use   • Vaping status: Never Used   Substance Use Topics   • Alcohol use: Never   • Drug use: Never   [3]  History reviewed. No pertinent past medical history.  [4]    Current Outpatient Medications:   •  levothyroxine (Synthroid, Levoxyl) 75 mcg tablet, TAKE 1 TABLET BY MOUTH ONCE DAILY EARLY IN THE MORNING, Disp: 90 tablet, Rfl: 0  •  semaglutide 2 mg/dose (8 mg/3 mL) pen injector, Inject 2 mg under the skin every 7 days., Disp: , Rfl:   •  triamcinolone (Kenalog) 0.1 % ointment, Apply topically 2 times a day., Disp: , Rfl:   [5]  Family History  Problem Relation Name Age of Onset   • Hyperlipidemia Father     • Diabetes Maternal Grandmother     • Diabetes Paternal Grandmother     • Heart disease Paternal Grandfather     [6]  Past Surgical History:  Procedure Laterality Date   • BREAST SURGERY Right     fatty cyst removal   •  SECTION, LOW TRANSVERSE     • HYSTERECTOMY

## 2025-07-02 NOTE — TELEPHONE ENCOUNTER
14 day zio patch [id number] no auth is required per Sharri at [facility] call reference# [id number]

## 2025-07-02 NOTE — PROGRESS NOTES
Chief Complaint:  Chief Complaint   Patient presents with    New Patient Visit     Palpitations on and off 2.5 years       Radha Evans is a 29 y.o. female that presents to the office today with her  and children for new patient cardiac evaluation.  She was referred by her primary care provider Dr. Aron Tapia MD for palpitations.  She has no documented history of CAD or valvular abnormalities.  She has a past medical history of palpitations, tachycardia, dizziness, hypertension, hypothyroidism, vitamin D deficiency.  She denies tobacco use, vaping, alcohol use, recreational drug use.  Denies caffeine intake.      She reports she has had palpitations, lightheadedness, tachycardia and low blood pressure for some time but feels her symptoms are worsening.  She reports she was referred to electrophysiology last year but did not follow through.  She reports her heart rate can go as high as 150s at rest.  She reports that she becomes lightheaded dizzy with position changes.  Denies any chest pain, chest pressure, chest tightness.  Reports extreme fatigue and shortness of breath on exertion.      Testing Reviewed  EKG obtained in the office today and verified with Dr. Leavitt shows sinus tachycardia   bpm, QT/Qtc  320/433    8/9/2024 event monitor  The predominant rhythm was Sinus. * The Maximum Heart Rate recorded was 155 bpm, 08/ 15 15: 35: 24, the Minimum Heart Rate recorded was 46 bpm, 08/ 22 07: 25: 49, and the Average Heart Rate was 82 bpm. * There were 19 SVE beats with a burden of < 1 % . * There were 40 Patient Triggers.    8/9/2024 echocardiogram  1. The left ventricular systolic function is normal, with a visually estimated ejection fraction of 65%.   2. There is normal right ventricular global systolic function.   3. RVSP within normal limits.      CBC:   Lab Results   Component Value Date    WBC 5.5 06/09/2025    RBC 4.66 06/09/2025    HGB 12.1 06/09/2025    HCT 38.7 06/09/2025      06/09/2025        CMP:    Lab Results   Component Value Date     06/09/2025    K 4.4 06/09/2025     06/09/2025    CO2 21 06/09/2025    BUN 6 (L) 06/09/2025    CREATININE 0.57 06/09/2025    GLUCOSE 99 06/09/2025    CALCIUM 9.5 06/09/2025       Lipid Profile:    Lab Results   Component Value Date    TRIG 48 07/26/2024    HDL 57.2 07/26/2024    LDLCALC 123 (H) 07/26/2024       BMP:  Lab Results   Component Value Date     06/09/2025     07/26/2024    K 4.4 06/09/2025    K 4.1 07/26/2024     06/09/2025     07/26/2024    CO2 21 06/09/2025    CO2 27 07/26/2024    BUN 6 (L) 06/09/2025    BUN 9 07/26/2024    CREATININE 0.57 06/09/2025    CREATININE 0.62 07/26/2024       CBC:  Lab Results   Component Value Date    WBC 5.5 06/09/2025    WBC 5.9 07/26/2024    RBC 4.66 06/09/2025    RBC 5.06 07/26/2024    HGB 12.1 06/09/2025    HGB 12.8 07/26/2024    HCT 38.7 06/09/2025    HCT 41.2 07/26/2024    MCV 83.0 06/09/2025    MCV 81 07/26/2024    MCH 26.0 (L) 06/09/2025    MCH 25.3 (L) 07/26/2024    MCHC 31.3 (L) 06/09/2025    MCHC 31.1 (L) 07/26/2024    RDW 12.7 06/09/2025    RDW 13.0 07/26/2024     06/09/2025     07/26/2024    MPV 12.1 06/09/2025       Hepatic Function Panel:    Lab Results   Component Value Date    ALKPHOS 52 06/09/2025    ALT 9 06/09/2025    AST 12 06/09/2025    PROT 7.1 06/09/2025    BILITOT 0.5 06/09/2025       HgBA1c:    Lab Results   Component Value Date    HGBA1C 5.1 06/09/2025       TSH:    Lab Results   Component Value Date    TSH 0.68 06/09/2025       Problem List[1]    Social History[2]    Medical History[3]    Current Medications[4]    Gluten    Family History[5]    Surgical History[6]       Review of systems  Constitutional: No weight loss, fever, chills, weakness.  Positive for fatigue  HEENT: No visual loss, blurred vision, double vision or yellow sclerae  Skin: No rash or itching  Cardiovascular: No chest pain, pressure or discomfort, edema.  Positive  "for frequent palpitations  Respiratory: Positive for exertional shortness of breath.denies cough or sputum  Gastrointestinal: No nausea, vomiting or diarrhea. No bloody or dark tarry stools.  Neurological: Positive for lightheadedness.  No headache, syncope.   Musculoskeletal: No muscle, back pain, joint pain or stiffness.  Hematologic: No anemia, bleeding or bruising.    BP 90/62 (BP Location: Left arm, Patient Position: Sitting)   Pulse 110   Ht 1.588 m (5' 2.5\")   Wt 60.3 kg (133 lb)   BMI 23.94 kg/m²     Physical Exam  Constitutional: Well developed, awake/alert x 3, no distress.  Head/Neck: No JVD, No bruits  Respiratory/Thorax: patent airways, CTAB, normal breath sounds with good expansion.  Cardiovascular: Regular  rhythm, irregular rate, no murmurs, normal S1 and S2,   Gastrointestinal: Non distended, soft, non-tender, no rebound tenderness or guarding.  Extremities: No cyanosis, edema.   Neurological: Alert and oriented x 3. Moves extremities spontaneous with purpose.  Psychological: Appropriate mood and behavior  Skin: Warm and Dry. No lesions or rashes.     Assessment/Plan  Palpitations/tachycardia; obtain 14-day Zio patch  Shortness of breath; recent echocardiogram as noted above.  Hypertension;  Hydration has been emphasized  Increased sodium intake has been emphasized  Recommend knee-high compression stockings  Referral to electrophysiology/Dr. Caballero  Follow-up in the office after monitor for results and further recommendations.  Please excuse any errors in grammar or translation related to dictation, voice recognition software was used to prepare this document.           [1]   Patient Active Problem List  Diagnosis    H/O: hysterectomy    Acquired hypothyroidism    Iron deficiency anemia secondary to inadequate dietary iron intake    Vitamin D deficiency    Hypotension    Fibrocystic breast changes, bilateral   [2]   Social History  Tobacco Use    Smoking status: Never     Passive exposure: Never "    Smokeless tobacco: Never    Tobacco comments:     Cigarettes casually   Vaping Use    Vaping status: Never Used   Substance Use Topics    Alcohol use: Never    Drug use: Never   [3] History reviewed. No pertinent past medical history.  [4]   Current Outpatient Medications:     levothyroxine (Synthroid, Levoxyl) 75 mcg tablet, TAKE 1 TABLET BY MOUTH ONCE DAILY EARLY IN THE MORNING, Disp: 90 tablet, Rfl: 0    semaglutide 2 mg/dose (8 mg/3 mL) pen injector, Inject 2 mg under the skin every 7 days., Disp: , Rfl:     triamcinolone (Kenalog) 0.1 % ointment, Apply topically 2 times a day., Disp: , Rfl:   [5]   Family History  Problem Relation Name Age of Onset    Hyperlipidemia Father      Diabetes Maternal Grandmother      Diabetes Paternal Grandmother      Heart disease Paternal Grandfather     [6]   Past Surgical History:  Procedure Laterality Date    BREAST SURGERY Right     fatty cyst removal     SECTION, LOW TRANSVERSE      HYSTERECTOMY

## 2025-07-02 NOTE — PATIENT INSTRUCTIONS
Please try to increase your water intake, you should try to drink 6 - 8 glasses/bottles of water per day to stay well hydrated.      Increase your salt intake    Try knee high compression stocking 20-30 compression stocking

## 2025-07-03 ENCOUNTER — ANCILLARY PROCEDURE (OUTPATIENT)
Dept: CARDIOLOGY | Facility: CLINIC | Age: 30
End: 2025-07-03
Payer: COMMERCIAL

## 2025-07-03 DIAGNOSIS — R00.0 TACHYCARDIA, UNSPECIFIED: ICD-10-CM

## 2025-07-03 DIAGNOSIS — R00.2 PALPITATIONS: ICD-10-CM

## 2025-07-07 ENCOUNTER — DOCUMENTATION (OUTPATIENT)
Dept: CARDIOLOGY | Facility: HOSPITAL | Age: 30
End: 2025-07-07
Payer: COMMERCIAL

## 2025-07-09 ENCOUNTER — OFFICE VISIT (OUTPATIENT)
Dept: CARDIOLOGY | Facility: CLINIC | Age: 30
End: 2025-07-09
Payer: COMMERCIAL

## 2025-07-09 VITALS
WEIGHT: 134 LBS | HEIGHT: 62 IN | DIASTOLIC BLOOD PRESSURE: 80 MMHG | BODY MASS INDEX: 24.66 KG/M2 | SYSTOLIC BLOOD PRESSURE: 120 MMHG

## 2025-07-09 DIAGNOSIS — G90.A POTS (POSTURAL ORTHOSTATIC TACHYCARDIA SYNDROME): Primary | ICD-10-CM

## 2025-07-09 DIAGNOSIS — R00.0 TACHYCARDIA, UNSPECIFIED: ICD-10-CM

## 2025-07-09 DIAGNOSIS — R00.2 PALPITATIONS: ICD-10-CM

## 2025-07-09 PROBLEM — N12 PYELONEPHRITIS: Status: ACTIVE | Noted: 2025-05-11

## 2025-07-09 PROBLEM — J06.9 VIRAL UPPER RESPIRATORY TRACT INFECTION: Status: ACTIVE | Noted: 2025-07-09

## 2025-07-09 PROCEDURE — 99202 OFFICE O/P NEW SF 15 MIN: CPT | Performed by: MARRIAGE & FAMILY THERAPIST

## 2025-07-09 PROCEDURE — 99203 OFFICE O/P NEW LOW 30 MIN: CPT | Performed by: INTERNAL MEDICINE

## 2025-07-09 PROCEDURE — 1036F TOBACCO NON-USER: CPT | Performed by: INTERNAL MEDICINE

## 2025-07-09 PROCEDURE — 3008F BODY MASS INDEX DOCD: CPT | Performed by: INTERNAL MEDICINE

## 2025-07-09 NOTE — ASSESSMENT & PLAN NOTE
This patient has been experiencing palpitations primarily when she is standing up.  She also has had some lightheadedness during these episodes.  No falls or syncope.  She is currently wearing an extended Holter monitor.  Autonomic testing is recommended to assess for POTS, and a neurology consultation can be obtained if necessary.  Further treatment recommendations will be made following review of the tilt table testing.

## 2025-07-09 NOTE — PROGRESS NOTES
"      Reason For Consult:    Palpitations    History Of Present Illness:    This is a 29-year-old female with palpitations.  She is being seen today for an electrophysiology consultation.  The patient reports the development of palpitations when she stands up.  She becomes very symptomatic especially when she takes a hot shower.  Sometimes she feels lightheaded and has felt near syncopal.  The symptoms began approximately a year and a half ago but have progressively worsened.  She is currently wearing an extended Holter monitor, and she is scheduled for a tilt table test at [facility] in August.    Past medical history:    Hypothyroidism    Family history: Positive for CAD    Social history: Non-smoker and denies significant caffeine use    Review of systems:  Other review of systems negative other than as outlined in HPI    Social History:  She reports that she has never smoked. She has never been exposed to tobacco smoke. She has never used smokeless tobacco. She reports that she does not drink alcohol and does not use drugs.    Family History:  Family History[1]    Allergies:  Gluten    Medications:  Current Outpatient Medications   Medication Instructions    levothyroxine (Synthroid, Levoxyl) 75 mcg tablet TAKE 1 TABLET BY MOUTH ONCE DAILY EARLY IN THE MORNING    semaglutide 2 mg, Every 7 days    triamcinolone (Kenalog) 0.1 % ointment 2 times daily       Vitals:      8/30/2024     4:10 PM 8/30/2024     4:11 PM 6/9/2025     3:35 PM 6/9/2025     4:00 PM 6/9/2025     4:02 PM 7/2/2025    10:06 AM 7/9/2025     3:43 PM   Vitals   Systolic 108 107 80 92 112 90 120   Diastolic 74 75 53 68 82 62 80   BP Location    Left arm Left arm Left arm Right arm   Heart Rate 83 83 81 79 94 110    Height   1.588 m (5' 2.5\")   1.588 m (5' 2.5\") 1.575 m (5' 2\")   Weight (lb)   139   133 134   BMI   25.02 kg/m2   23.94 kg/m2 24.51 kg/m2   BSA (m2)   1.67 m2   1.63 m2 1.63 m2   Visit Report Report Report Report Report Report " "Report Report       Physical Exam:    General Appearance:  Alert, oriented, no distress  Skin:  Warm and dry  Head and Neck:  No elevation of JVP, no carotid bruits  Cardiac Exam:  Rhythm is regular, S1 and S2 are normal, no murmur S3 or S4  Lungs:  Clear to auscultation  Extremities:  no edema  Neurologic:  No focal deficits  Psychiatric:  Appropriate mood and behavior    Lab Results:     CMP:  Recent Labs     06/09/25  1633 07/26/24  1540    140   K 4.4 4.1    105   CO2 21 27   ANIONGAP 11 12   BUN 6* 9   CREATININE 0.57 0.62   EGFR 127 >90     Recent Labs     06/09/25  1633 07/26/24  1540   ALBUMIN 4.5 4.7   ALKPHOS 52 58   ALT 9 13   AST 12 12   BILITOT 0.5 0.6     CBC:  Recent Labs     06/09/25  1633 07/26/24  1540   WBC 5.5 5.9   HGB 12.1 12.8   HCT 38.7 41.2    216   MCV 83.0 81     COAG: No results for input(s): \"PTT\", \"INR\", \"HAUF\", \"DDIMERVTE\", \"HAPTOGLOBIN\", \"FIBRINOGEN\" in the last 71122 hours.  HEME/ENDO:  Recent Labs     06/09/25  1633 07/26/24  1540   TSH 0.68 1.42   HGBA1C 5.1 5.1      CARDIAC: No results for input(s): \"LDH\", \"CKMB\", \"TROPHS\", \"BNP\" in the last 00354 hours.    No lab exists for component: \"CK\", \"CKMBP\"  Recent Labs     07/26/24  1540   CHOL 190   HDL 57.2   TRIG 48       Test Results (personally reviewed):    EKG: Normal sinus rhythm, no ventricular preexcitation  Echocardiogram: Normal left ventricular function  Holter monitor August 2024: Sinus rhythm, minimum heart rate 46 bpm, maximum heart rate 155 bpm, and average heart rate 82 bpm.    Assessment/Plan  Problem List Items Addressed This Visit           ICD-10-CM    POTS (postural orthostatic tachycardia syndrome) - Primary G90.A    This patient has been experiencing palpitations primarily when she is standing up.  She also has had some lightheadedness during these episodes.  No falls or syncope.  She is currently wearing an extended Holter monitor.  Autonomic testing is recommended to assess for POTS, and a " neurology consultation can be obtained if necessary.  Further treatment recommendations will be made following review of the tilt table testing.         Relevant Orders    Autonomic Testing     Other Visit Diagnoses         Codes      Palpitations     R00.2    stable  - concern for POTS   - check for autonomic testing with cardiology   - f/u with specialist       Tachycardia, unspecified     R00.0          James C Ramicone, DO         [1]   Family History  Problem Relation Name Age of Onset    Hyperlipidemia Father      Diabetes Maternal Grandmother      Diabetes Paternal Grandmother      Heart disease Paternal Grandfather

## 2025-07-22 ENCOUNTER — HOSPITAL ENCOUNTER (OUTPATIENT)
Dept: NEUROLOGY | Facility: HOSPITAL | Age: 30
Discharge: HOME | End: 2025-07-22
Payer: COMMERCIAL

## 2025-07-22 DIAGNOSIS — G90.A POTS (POSTURAL ORTHOSTATIC TACHYCARDIA SYNDROME): ICD-10-CM

## 2025-07-22 PROCEDURE — 95923 AUTONOMIC NRV SYST FUNJ TEST: CPT | Performed by: PSYCHIATRY & NEUROLOGY

## 2025-07-22 PROCEDURE — 95924 ANS PARASYMP & SYMP W/TILT: CPT | Performed by: PSYCHIATRY & NEUROLOGY

## 2025-07-28 ENCOUNTER — TELEPHONE (OUTPATIENT)
Dept: CARDIOLOGY | Facility: CLINIC | Age: 30
End: 2025-07-28

## 2025-07-28 PROCEDURE — 93248 EXT ECG>7D<15D REV&INTERPJ: CPT | Performed by: INTERNAL MEDICINE

## 2025-08-11 ENCOUNTER — APPOINTMENT (OUTPATIENT)
Dept: CARDIOLOGY | Facility: CLINIC | Age: 30
End: 2025-08-11
Payer: COMMERCIAL

## 2025-08-11 VITALS
WEIGHT: 128.4 LBS | BODY MASS INDEX: 23.63 KG/M2 | HEIGHT: 62 IN | HEART RATE: 106 BPM | SYSTOLIC BLOOD PRESSURE: 98 MMHG | DIASTOLIC BLOOD PRESSURE: 60 MMHG

## 2025-08-11 DIAGNOSIS — R00.2 PALPITATIONS: ICD-10-CM

## 2025-08-11 DIAGNOSIS — R94.31 ABNORMAL EKG: ICD-10-CM

## 2025-08-11 DIAGNOSIS — R00.0 TACHYCARDIA, UNSPECIFIED: ICD-10-CM

## 2025-08-11 DIAGNOSIS — I95.9 HYPOTENSION, UNSPECIFIED HYPOTENSION TYPE: ICD-10-CM

## 2025-08-11 DIAGNOSIS — R55 SYNCOPE, UNSPECIFIED SYNCOPE TYPE: ICD-10-CM

## 2025-08-11 DIAGNOSIS — G90.A POTS (POSTURAL ORTHOSTATIC TACHYCARDIA SYNDROME): ICD-10-CM

## 2025-08-11 DIAGNOSIS — R42 DIZZINESS AND GIDDINESS: ICD-10-CM

## 2025-08-11 DIAGNOSIS — I47.11 INAPPROPRIATE SINUS TACHYCARDIA: Primary | ICD-10-CM

## 2025-08-11 DIAGNOSIS — E03.9 ACQUIRED HYPOTHYROIDISM: ICD-10-CM

## 2025-08-11 DIAGNOSIS — E03.9 HYPOTHYROIDISM, UNSPECIFIED TYPE: ICD-10-CM

## 2025-08-11 PROCEDURE — 99214 OFFICE O/P EST MOD 30 MIN: CPT | Performed by: INTERNAL MEDICINE

## 2025-08-11 PROCEDURE — 3008F BODY MASS INDEX DOCD: CPT | Performed by: INTERNAL MEDICINE

## 2025-08-11 PROCEDURE — 93000 ELECTROCARDIOGRAM COMPLETE: CPT | Performed by: INTERNAL MEDICINE

## 2025-08-11 RX ORDER — METOPROLOL SUCCINATE 25 MG/1
25 TABLET, EXTENDED RELEASE ORAL DAILY
Qty: 90 TABLET | Refills: 3 | Status: SHIPPED | OUTPATIENT
Start: 2025-08-11 | End: 2026-08-11

## 2025-08-14 ENCOUNTER — APPOINTMENT (OUTPATIENT)
Dept: CARDIOLOGY | Facility: HOSPITAL | Age: 30
End: 2025-08-14
Payer: COMMERCIAL

## 2025-08-18 DIAGNOSIS — G90.A POTS (POSTURAL ORTHOSTATIC TACHYCARDIA SYNDROME): Primary | ICD-10-CM

## 2025-09-10 ENCOUNTER — APPOINTMENT (OUTPATIENT)
Dept: NEUROLOGY | Facility: HOSPITAL | Age: 30
End: 2025-09-10
Payer: COMMERCIAL

## 2025-09-22 ENCOUNTER — APPOINTMENT (OUTPATIENT)
Dept: CARDIOLOGY | Facility: CLINIC | Age: 30
End: 2025-09-22
Payer: COMMERCIAL